# Patient Record
Sex: MALE | Race: WHITE | NOT HISPANIC OR LATINO | URBAN - NONMETROPOLITAN AREA
[De-identification: names, ages, dates, MRNs, and addresses within clinical notes are randomized per-mention and may not be internally consistent; named-entity substitution may affect disease eponyms.]

---

## 2023-05-17 ENCOUNTER — OFFICE VISIT (OUTPATIENT)
Dept: FAMILY MEDICINE CLINIC | Facility: CLINIC | Age: 43
End: 2023-05-17
Payer: OTHER GOVERNMENT

## 2023-05-17 VITALS
SYSTOLIC BLOOD PRESSURE: 158 MMHG | DIASTOLIC BLOOD PRESSURE: 88 MMHG | OXYGEN SATURATION: 96 % | HEIGHT: 71 IN | BODY MASS INDEX: 40.43 KG/M2 | WEIGHT: 288.8 LBS | HEART RATE: 109 BPM

## 2023-05-17 DIAGNOSIS — Z76.89 ENCOUNTER TO ESTABLISH CARE: ICD-10-CM

## 2023-05-17 DIAGNOSIS — L72.9 SKIN CYSTS, GENERALIZED: Primary | ICD-10-CM

## 2023-05-17 PROBLEM — J30.1 SEASONAL ALLERGIC RHINITIS DUE TO POLLEN: Status: ACTIVE | Noted: 2018-10-03

## 2023-05-17 PROBLEM — L20.84 INTRINSIC ECZEMA: Status: ACTIVE | Noted: 2023-05-17

## 2023-05-17 PROBLEM — L40.9 PSORIASIS: Status: ACTIVE | Noted: 2023-05-17

## 2023-05-17 PROCEDURE — 99203 OFFICE O/P NEW LOW 30 MIN: CPT | Performed by: NURSE PRACTITIONER

## 2023-05-17 RX ORDER — CLOBETASOL PROPIONATE 0.5 MG/G
OINTMENT TOPICAL
COMMUNITY
Start: 2023-01-23

## 2023-05-17 RX ORDER — OMEGA-3 FATTY ACIDS/FISH OIL 300-1000MG
200 CAPSULE ORAL EVERY 6 HOURS PRN
COMMUNITY

## 2023-05-17 RX ORDER — CLOBETASOL PROPIONATE 0.5 MG/G
AEROSOL, FOAM TOPICAL
COMMUNITY
Start: 2023-03-15

## 2023-05-17 NOTE — PROGRESS NOTES
"Chief Complaint  Establish Care (New PT )    Subjective   Encounter to establish primary care. Lives in [facility] [place] but due to  need PCP in network. Currently seeing dermatology Specialist at [facility] in [place] for multiple nodule to face and neck.  \"They are concerned it may be some type of vascular issue and they are trying to get an MRI of the head and neck but insurance would not approve it because I do not have a primary care provider.  The lumps have been there my whole life.  They do not hurt.  I do not even feel them.\"  Does report father had a history of malignant melanoma.          Mike Bahena presents to [facility] - [place]  Cyst  This is a chronic problem. The current episode started more than 1 year ago. The problem occurs constantly. The problem has been gradually worsening. Pertinent negatives include no fatigue or rash. Nothing aggravates the symptoms. He has tried nothing for the symptoms. The treatment provided no relief.     Current Outpatient Medications on File Prior to Visit   Medication Sig Dispense Refill   • Cetirizine HCl 10 MG/ML solution 1 each Daily.     • clobetasol (OLUX) 0.05 % topical foam APPLY TO SCALP TWICE DAILY FOR 2 WEEKS PER MONTH     • clobetasol (TEMOVATE) 0.05 % ointment      • Ibuprofen 200 MG capsule Take 200 mg by mouth Every 6 (Six) Hours As Needed.       No current facility-administered medications on file prior to visit.     Allergies   Allergen Reactions   • Cats Claw (Uncaria Tomentosa) Anaphylaxis and Itching   • Dust Mite Extract Other (See Comments)       Objective   Vital Signs:  /88   Pulse 109   Ht 180.3 cm (71\")   Wt 131 kg (288 lb 12.8 oz)   SpO2 96%   BMI 40.28 kg/m²   Estimated body mass index is 40.28 kg/m² as calculated from the following:    Height as of this encounter: 180.3 cm (71\").    Weight as of this encounter: 131 kg (288 lb 12.8 oz).         Physical Exam  Vitals and " nursing note reviewed.   Constitutional:       Appearance: Normal appearance. He is well-developed. He is morbidly obese.   HENT:      Head: Normocephalic.      Right Ear: Tympanic membrane, ear canal and external ear normal.      Left Ear: Tympanic membrane, ear canal and external ear normal.      Mouth/Throat:      Mouth: Mucous membranes are moist.      Pharynx: Oropharynx is clear.   Eyes:      Extraocular Movements: Extraocular movements intact.      Conjunctiva/sclera: Conjunctivae normal.      Pupils: Pupils are equal, round, and reactive to light.   Cardiovascular:      Rate and Rhythm: Normal rate and regular rhythm.      Heart sounds: Normal heart sounds.   Pulmonary:      Effort: Pulmonary effort is normal.      Breath sounds: Normal breath sounds.   Abdominal:      General: Bowel sounds are normal.      Palpations: Abdomen is soft.   Musculoskeletal:         General: Normal range of motion.      Cervical back: Normal range of motion and neck supple.   Skin:     General: Skin is warm.      Capillary Refill: Capillary refill takes less than 2 seconds.      Comments: Multiple cystic appearing masses noted to chin, bilateral cheeks and neck   Neurological:      Mental Status: He is alert and oriented to person, place, and time.   Psychiatric:         Behavior: Behavior normal.        Result Review :                   Assessment and Plan   Diagnoses and all orders for this visit:    1. Skin cysts, generalized (Primary)    2. Encounter to establish care    1.  Skin cyst, generalized:  Will request a copy of ordered MRIs from [facility] specialist to see if those MRIs can be ordered by this office and performed in [facility]  Continue follow-up with dermatology as scheduled    2.  Encounter to establish care:  Continue on current medications as previously prescribed        I spent 31 minutes caring for Mike on this date of service. This time includes time spent by me in the following  activities:preparing for the visit, reviewing tests, obtaining and/or reviewing a separately obtained history, performing a medically appropriate examination and/or evaluation , counseling and educating the patient/family/caregiver, referring and communicating with other health care professionals  and documenting information in the medical record  Follow Up   Return in about 1 year (around 5/17/2024) for Annual physical.  Patient was given instructions and counseling regarding his condition or for health maintenance advice. Please see specific information pulled into the AVS if appropriate.         This document has been electronically signed by JOLIE Smalls on May 18, 2023 07:09 CDT

## 2023-05-18 DIAGNOSIS — R22.9 MULTIPLE SKIN NODULES: ICD-10-CM

## 2023-05-18 DIAGNOSIS — L72.9 SKIN CYSTS, GENERALIZED: Primary | ICD-10-CM

## 2023-05-25 ENCOUNTER — TELEPHONE (OUTPATIENT)
Dept: FAMILY MEDICINE CLINIC | Facility: CLINIC | Age: 43
End: 2023-05-25
Payer: OTHER GOVERNMENT

## 2023-05-25 NOTE — TELEPHONE ENCOUNTER
Tried contacting [facility] to get patient schedule, had me on hold for 23 minutes I had to hang up. Gave number to patient for him to call get the MRI scheduled. Notified him he needed a new referral placed just give us a call and let us know. Patient verbally understood

## 2023-05-25 NOTE — TELEPHONE ENCOUNTER
Sarah with [facility] called requesting to talk to Stephany in regards to the patient. She did not leave any other information. Please call [phone]

## 2023-08-04 DIAGNOSIS — R22.9 MULTIPLE SKIN NODULES: ICD-10-CM

## 2023-08-04 DIAGNOSIS — L72.9 SKIN CYSTS, GENERALIZED: ICD-10-CM
